# Patient Record
Sex: FEMALE | Race: WHITE
[De-identification: names, ages, dates, MRNs, and addresses within clinical notes are randomized per-mention and may not be internally consistent; named-entity substitution may affect disease eponyms.]

---

## 2017-08-02 ENCOUNTER — RECORDS - HEALTHEAST (OUTPATIENT)
Dept: ADMINISTRATIVE | Facility: OTHER | Age: 32
End: 2017-08-02

## 2018-09-05 ENCOUNTER — RECORDS - HEALTHEAST (OUTPATIENT)
Dept: ADMINISTRATIVE | Facility: OTHER | Age: 33
End: 2018-09-05

## 2022-07-15 ENCOUNTER — APPOINTMENT (OUTPATIENT)
Dept: URBAN - METROPOLITAN AREA CLINIC 259 | Age: 37
Setting detail: DERMATOLOGY
End: 2022-07-19

## 2022-07-15 DIAGNOSIS — D22 MELANOCYTIC NEVI: ICD-10-CM

## 2022-07-15 DIAGNOSIS — L57.8 OTHER SKIN CHANGES DUE TO CHRONIC EXPOSURE TO NONIONIZING RADIATION: ICD-10-CM

## 2022-07-15 PROBLEM — D48.5 NEOPLASM OF UNCERTAIN BEHAVIOR OF SKIN: Status: ACTIVE | Noted: 2022-07-15

## 2022-07-15 PROBLEM — D22.71 MELANOCYTIC NEVI OF RIGHT LOWER LIMB, INCLUDING HIP: Status: ACTIVE | Noted: 2022-07-15

## 2022-07-15 PROBLEM — D22.5 MELANOCYTIC NEVI OF TRUNK: Status: ACTIVE | Noted: 2022-07-15

## 2022-07-15 PROCEDURE — OTHER MIPS QUALITY: OTHER

## 2022-07-15 PROCEDURE — 11301 SHAVE SKIN LESION 0.6-1.0 CM: CPT | Mod: 76

## 2022-07-15 PROCEDURE — OTHER SHAVE REMOVAL: OTHER

## 2022-07-15 PROCEDURE — OTHER COUNSELING: OTHER

## 2022-07-15 PROCEDURE — 11301 SHAVE SKIN LESION 0.6-1.0 CM: CPT

## 2022-07-15 PROCEDURE — 99203 OFFICE O/P NEW LOW 30 MIN: CPT | Mod: 25

## 2022-07-15 ASSESSMENT — LOCATION ZONE DERM
LOCATION ZONE: LEG
LOCATION ZONE: TRUNK

## 2022-07-15 ASSESSMENT — LOCATION DETAILED DESCRIPTION DERM
LOCATION DETAILED: RIGHT INGUINAL CREASE
LOCATION DETAILED: RIGHT SUPERIOR LATERAL UPPER BACK
LOCATION DETAILED: RIGHT ANTERIOR PROXIMAL THIGH
LOCATION DETAILED: RIGHT MID-UPPER BACK
LOCATION DETAILED: LEFT INGUINAL CREASE
LOCATION DETAILED: RIGHT SUPERIOR MEDIAL UPPER BACK

## 2022-07-15 ASSESSMENT — LOCATION SIMPLE DESCRIPTION DERM
LOCATION SIMPLE: GROIN
LOCATION SIMPLE: RIGHT BACK
LOCATION SIMPLE: RIGHT UPPER BACK
LOCATION SIMPLE: RIGHT THIGH

## 2022-07-15 NOTE — HPI: SKIN LESIONS
How Severe Is Your Skin Lesion?: mild
Have Your Skin Lesions Been Treated?: not been treated
Is This A New Presentation, Or A Follow-Up?: Skin Lesions
Additional History: Patient is here seeking treatment on moles she has on her right posterior shoulder, and in her groin. They are not bothersome, but she wants reassurance. Lastly, she also has a spot on her ankle she wants looked at too.

## 2022-07-15 NOTE — PROCEDURE: SHAVE REMOVAL
Medical Necessity Clause: This procedure was medically necessary because the lesion that was treated was:
Anesthesia Type: 1% lidocaine with epinephrine
Size Of Lesion In Cm (Required): 0.6
Billing Type: Third-Party Bill
Detail Level: Detailed
Hemostasis: Drysol
Render Path Notes In Note?: No
Notification Instructions: Patient will be notified of pathology results. However, patient instructed to call the office if not contacted within 2 weeks.
X Size Of Lesion In Cm (Optional): 0
Medical Necessity Information: It is in your best interest to select a reason for this procedure from the list below. All of these items fulfill various CMS LCD requirements except the new and changing color options.
Wound Care: Petrolatum
Biopsy Method: Dermablade
Was A Bandage Applied: Yes
Post-Care Instructions: I reviewed with the patient in detail post-care instructions. Patient is to keep the biopsy site dry overnight, and then apply bacitracin twice daily until healed. Patient may apply hydrogen peroxide soaks to remove any crusting.
Consent was obtained from the patient. The risks and benefits to therapy were discussed in detail. Specifically, the risks of infection, scarring, bleeding, prolonged wound healing, incomplete removal, allergy to anesthesia, nerve injury and recurrence were addressed. Prior to the procedure, the treatment site was clearly identified and confirmed by the patient. All components of Universal Protocol/PAUSE Rule completed.

## 2022-09-16 ENCOUNTER — APPOINTMENT (OUTPATIENT)
Dept: URBAN - METROPOLITAN AREA CLINIC 259 | Age: 37
Setting detail: DERMATOLOGY
End: 2022-09-16

## 2022-09-16 DIAGNOSIS — D22 MELANOCYTIC NEVI: ICD-10-CM

## 2022-09-16 DIAGNOSIS — Z71.89 OTHER SPECIFIED COUNSELING: ICD-10-CM

## 2022-09-16 DIAGNOSIS — L57.8 OTHER SKIN CHANGES DUE TO CHRONIC EXPOSURE TO NONIONIZING RADIATION: ICD-10-CM

## 2022-09-16 DIAGNOSIS — L81.4 OTHER MELANIN HYPERPIGMENTATION: ICD-10-CM

## 2022-09-16 DIAGNOSIS — D18.0 HEMANGIOMA: ICD-10-CM

## 2022-09-16 DIAGNOSIS — L21.8 OTHER SEBORRHEIC DERMATITIS: ICD-10-CM

## 2022-09-16 PROBLEM — D22.5 MELANOCYTIC NEVI OF TRUNK: Status: ACTIVE | Noted: 2022-09-16

## 2022-09-16 PROBLEM — D18.01 HEMANGIOMA OF SKIN AND SUBCUTANEOUS TISSUE: Status: ACTIVE | Noted: 2022-09-16

## 2022-09-16 PROCEDURE — 99213 OFFICE O/P EST LOW 20 MIN: CPT

## 2022-09-16 PROCEDURE — OTHER MIPS QUALITY: OTHER

## 2022-09-16 PROCEDURE — OTHER PRESCRIPTION: OTHER

## 2022-09-16 PROCEDURE — OTHER COUNSELING: OTHER

## 2022-09-16 RX ORDER — KETOCONAZOLE 20 MG/ML
SHAMPOO, SUSPENSION TOPICAL
Qty: 120 | Refills: 6 | Status: ERX | COMMUNITY
Start: 2022-09-16

## 2022-09-16 ASSESSMENT — LOCATION ZONE DERM
LOCATION ZONE: TRUNK
LOCATION ZONE: SCALP

## 2022-09-16 ASSESSMENT — LOCATION SIMPLE DESCRIPTION DERM
LOCATION SIMPLE: RIGHT SCALP
LOCATION SIMPLE: UPPER BACK
LOCATION SIMPLE: LEFT UPPER BACK

## 2022-09-16 ASSESSMENT — LOCATION DETAILED DESCRIPTION DERM
LOCATION DETAILED: INFERIOR THORACIC SPINE
LOCATION DETAILED: LEFT MEDIAL UPPER BACK
LOCATION DETAILED: RIGHT MEDIAL FRONTAL SCALP
LOCATION DETAILED: LEFT INFERIOR MEDIAL UPPER BACK

## 2022-09-16 NOTE — PROCEDURE: MIPS QUALITY
Detail Level: Generalized
Quality 431: Preventive Care And Screening: Unhealthy Alcohol Use - Screening: Patient not identified as an unhealthy alcohol user when screened for unhealthy alcohol use using a systematic screening method
Quality 130: Documentation Of Current Medications In The Medical Record: Current Medications Documented
Quality 226: Preventive Care And Screening: Tobacco Use: Screening And Cessation Intervention: Patient screened for tobacco use and is an ex/non-smoker
Quality 110: Preventive Care And Screening: Influenza Immunization: Influenza Immunization Ordered or Recommended, but not Administered due to system reason

## 2022-09-16 NOTE — HPI: FULL BODY SKIN EXAMINATION
How Severe Are Your Spot(S)?: mild
What Is The Reason For Today's Visit?: Full Body Skin Examination
What Is The Reason For Today's Visit? (Being Monitored For X): concerning skin lesions on an annual basis
Additional History: Pt states she had a couple moles removed last time she was here. She has lesions that look similar to the ones that were removed.

## 2023-03-23 ENCOUNTER — APPOINTMENT (OUTPATIENT)
Dept: URBAN - METROPOLITAN AREA CLINIC 257 | Age: 38
Setting detail: DERMATOLOGY
End: 2023-03-23

## 2023-03-23 DIAGNOSIS — D22 MELANOCYTIC NEVI: ICD-10-CM

## 2023-03-23 DIAGNOSIS — Z87.2 PERSONAL HISTORY OF DISEASES OF THE SKIN AND SUBCUTANEOUS TISSUE: ICD-10-CM

## 2023-03-23 PROBLEM — D22.61 MELANOCYTIC NEVI OF RIGHT UPPER LIMB, INCLUDING SHOULDER: Status: ACTIVE | Noted: 2023-03-23

## 2023-03-23 PROCEDURE — OTHER COUNSELING: OTHER

## 2023-03-23 PROCEDURE — OTHER MIPS QUALITY: OTHER

## 2023-03-23 PROCEDURE — 99212 OFFICE O/P EST SF 10 MIN: CPT

## 2023-03-23 ASSESSMENT — LOCATION SIMPLE DESCRIPTION DERM
LOCATION SIMPLE: RIGHT FOREARM
LOCATION SIMPLE: RIGHT BACK

## 2023-03-23 ASSESSMENT — LOCATION ZONE DERM
LOCATION ZONE: TRUNK
LOCATION ZONE: ARM

## 2023-03-23 ASSESSMENT — LOCATION DETAILED DESCRIPTION DERM
LOCATION DETAILED: RIGHT VENTRAL PROXIMAL FOREARM
LOCATION DETAILED: RIGHT SUPERIOR LATERAL UPPER BACK

## 2023-05-26 ENCOUNTER — RX ONLY (RX ONLY)
Age: 38
End: 2023-05-26

## 2023-05-26 ENCOUNTER — APPOINTMENT (OUTPATIENT)
Dept: URBAN - METROPOLITAN AREA CLINIC 257 | Age: 38
Setting detail: DERMATOLOGY
End: 2023-05-26

## 2023-05-26 DIAGNOSIS — L65.8 OTHER SPECIFIED NONSCARRING HAIR LOSS: ICD-10-CM

## 2023-05-26 PROCEDURE — OTHER MIPS QUALITY: OTHER

## 2023-05-26 PROCEDURE — OTHER COUNSELING: OTHER

## 2023-05-26 PROCEDURE — 99213 OFFICE O/P EST LOW 20 MIN: CPT

## 2023-05-26 PROCEDURE — OTHER PRESCRIPTION: OTHER

## 2023-05-26 RX ORDER — MINOXIDIL 2.5 MG/1
TABLET ORAL
Qty: 45 | Refills: 0 | Status: ERX

## 2023-05-26 RX ORDER — MINOXIDIL 2.5 MG/1
TABLET ORAL
Qty: 45 | Refills: 0 | Status: CANCELLED

## 2023-05-26 RX ORDER — MINOXIDIL 2.5 MG/1
TABLET ORAL
Qty: 45 | Refills: 0 | COMMUNITY
Start: 2023-05-26

## 2023-05-26 ASSESSMENT — LOCATION DETAILED DESCRIPTION DERM
LOCATION DETAILED: MEDIAL FRONTAL SCALP
LOCATION DETAILED: LEFT SUPERIOR PARIETAL SCALP
LOCATION DETAILED: RIGHT SUPERIOR PARIETAL SCALP
LOCATION DETAILED: RIGHT CENTRAL FRONTAL SCALP

## 2023-05-26 ASSESSMENT — LOCATION ZONE DERM
LOCATION ZONE: SCALP
LOCATION ZONE: SCALP

## 2023-05-26 ASSESSMENT — LOCATION SIMPLE DESCRIPTION DERM
LOCATION SIMPLE: FRONTAL SCALP
LOCATION SIMPLE: RIGHT SCALP
LOCATION SIMPLE: SCALP

## 2023-08-22 RX ORDER — MINOXIDIL 2.5 MG/1
TABLET ORAL
Qty: 45 | Refills: 2 | Status: ERX

## 2023-08-29 ENCOUNTER — APPOINTMENT (OUTPATIENT)
Dept: URBAN - METROPOLITAN AREA CLINIC 259 | Age: 38
Setting detail: DERMATOLOGY
End: 2023-08-29

## 2023-08-29 DIAGNOSIS — L65.8 OTHER SPECIFIED NONSCARRING HAIR LOSS: ICD-10-CM

## 2023-08-29 PROCEDURE — 99213 OFFICE O/P EST LOW 20 MIN: CPT

## 2023-08-29 PROCEDURE — OTHER PRESCRIPTION MEDICATION MANAGEMENT: OTHER

## 2023-08-29 PROCEDURE — OTHER MIPS QUALITY: OTHER

## 2023-08-29 PROCEDURE — OTHER COUNSELING: OTHER

## 2023-08-29 ASSESSMENT — LOCATION DETAILED DESCRIPTION DERM: LOCATION DETAILED: MID-FRONTAL SCALP

## 2023-08-29 ASSESSMENT — LOCATION SIMPLE DESCRIPTION DERM: LOCATION SIMPLE: ANTERIOR SCALP

## 2023-08-29 ASSESSMENT — LOCATION ZONE DERM: LOCATION ZONE: SCALP

## 2023-08-29 NOTE — PROCEDURE: PRESCRIPTION MEDICATION MANAGEMENT
Detail Level: Zone
Continue Regimen: Half a Minoxidil 2.5mg tablet daily for 3 months
Render In Strict Bullet Format?: No
Plan: Pt will RTC in 3 months and continue taking her 90 day supply of Minoxidil.

## 2023-11-20 ENCOUNTER — APPOINTMENT (OUTPATIENT)
Dept: URBAN - METROPOLITAN AREA CLINIC 259 | Age: 38
Setting detail: DERMATOLOGY
End: 2023-11-21

## 2023-11-20 DIAGNOSIS — D49.2 NEOPLASM OF UNSPECIFIED BEHAVIOR OF BONE, SOFT TISSUE, AND SKIN: ICD-10-CM

## 2023-11-20 DIAGNOSIS — L65.8 OTHER SPECIFIED NONSCARRING HAIR LOSS: ICD-10-CM

## 2023-11-20 PROCEDURE — OTHER COUNSELING: OTHER

## 2023-11-20 PROCEDURE — OTHER PRESCRIPTION MEDICATION MANAGEMENT: OTHER

## 2023-11-20 PROCEDURE — 99213 OFFICE O/P EST LOW 20 MIN: CPT | Mod: 25

## 2023-11-20 PROCEDURE — OTHER PRESCRIPTION: OTHER

## 2023-11-20 PROCEDURE — OTHER MIPS QUALITY: OTHER

## 2023-11-20 PROCEDURE — OTHER SHAVE REMOVAL: OTHER

## 2023-11-20 PROCEDURE — 11311 SHAVE SKIN LESION 0.6-1.0 CM: CPT

## 2023-11-20 RX ORDER — MINOXIDIL 2.5 MG/1
TABLET ORAL
Qty: 45 | Refills: 3 | Status: ERX

## 2023-11-20 ASSESSMENT — LOCATION ZONE DERM
LOCATION ZONE: FACE
LOCATION ZONE: SCALP

## 2023-11-20 ASSESSMENT — LOCATION SIMPLE DESCRIPTION DERM
LOCATION SIMPLE: ANTERIOR SCALP
LOCATION SIMPLE: RIGHT FOREHEAD

## 2023-11-20 ASSESSMENT — LOCATION DETAILED DESCRIPTION DERM
LOCATION DETAILED: MID-FRONTAL SCALP
LOCATION DETAILED: RIGHT SUPERIOR FOREHEAD

## 2023-11-20 NOTE — PROCEDURE: COUNSELING
Patient Specific Counseling (Will Not Stick From Patient To Patient): **Recommended to try Silgaen scar gel or vitamin e oil
Detail Level: Detailed
Detail Level: Zone

## 2023-11-20 NOTE — PROCEDURE: PRESCRIPTION MEDICATION MANAGEMENT
Plan: Consult about PRP with Vi or SLP.
Render In Strict Bullet Format?: No
Continue Regimen: Minoxidil 2.5 tablet
Detail Level: Zone

## 2023-11-20 NOTE — HPI: SKIN LESION
What Type Of Note Output Would You Prefer (Optional)?: Standard Output
How Severe Is Your Skin Lesion?: mild
Has Your Skin Lesion Been Treated?: not been treated
Is This A New Presentation, Or A Follow-Up?: Mole
Additional History: Pt states she has a mole on her forehead that she would like removed if possible today.

## 2023-12-19 ENCOUNTER — APPOINTMENT (OUTPATIENT)
Dept: URBAN - METROPOLITAN AREA CLINIC 259 | Age: 38
Setting detail: DERMATOLOGY
End: 2023-12-19

## 2023-12-19 DIAGNOSIS — L65.8 OTHER SPECIFIED NONSCARRING HAIR LOSS: ICD-10-CM

## 2023-12-19 PROCEDURE — OTHER MIPS QUALITY: OTHER

## 2023-12-19 PROCEDURE — OTHER COSMETIC CONSULTATION: PRP: OTHER

## 2023-12-19 PROCEDURE — OTHER PLATELET RICH PLASMA INJECTION: OTHER

## 2023-12-19 ASSESSMENT — LOCATION ZONE DERM: LOCATION ZONE: SCALP

## 2023-12-19 ASSESSMENT — LOCATION DETAILED DESCRIPTION DERM: LOCATION DETAILED: POSTERIOR MID-PARIETAL SCALP

## 2023-12-19 ASSESSMENT — LOCATION SIMPLE DESCRIPTION DERM: LOCATION SIMPLE: POSTERIOR SCALP

## 2023-12-19 NOTE — PROCEDURE: PLATELET RICH PLASMA INJECTION
Depth In Mm (Will Not Render If 0): 0
Amount Of Blood Processed (Optional - Include Units): 13 mL
Detail Level: Zone
Topical Anesthesia Type: ice and vibration
Site Of Venipuncture?: right antecubital
Consent: Written consent obtained, risks reviewed including but not limited to pain, scarring, infection and incomplete improvement.  Patient understands the procedure is cosmetic in nature and will require out of pocket payment.
Venipuncture Paragraph: An alcohol pad was applied to the venipuncture site. Venipuncture was performed using a butterfly needle. Pressure and a bandaid was applied to the site. No complications were noted.
Price (Use Numbers Only, No Special Characters Or $): 290
Which Technique?: Default
Length Of Topical Anesthesia Application (Optional): 20 minutes
Treatment Number (Optional): 1
Who Performed The Venipuncture?: Bernice Cochran MA
Post-Care Instructions: After the procedure your scalp will be tender, and you may develop a headache.  You can ice the treated area and take acetaminophen or ibuprofen for comfort.  You may have a small amount of bleeding that can be dabbed with a clean gauze pad. \\Nato may wash your hair 2-3 hours after treatment.
Number Of Tubes Drawn: 2
Show Additional Techniques: Yes
Amount Of Blood Collected (Optional - Include Units): 30 mL

## 2024-01-29 ENCOUNTER — APPOINTMENT (OUTPATIENT)
Dept: URBAN - METROPOLITAN AREA CLINIC 259 | Age: 39
Setting detail: DERMATOLOGY
End: 2024-01-29

## 2024-01-29 DIAGNOSIS — L65.8 OTHER SPECIFIED NONSCARRING HAIR LOSS: ICD-10-CM

## 2024-01-29 PROCEDURE — OTHER MIPS QUALITY: OTHER

## 2024-01-29 PROCEDURE — OTHER PLATELET RICH PLASMA INJECTION: OTHER

## 2024-01-29 PROCEDURE — OTHER COSMETIC CONSULTATION: PRP: OTHER

## 2024-01-29 ASSESSMENT — LOCATION ZONE DERM: LOCATION ZONE: SCALP

## 2024-01-29 ASSESSMENT — LOCATION DETAILED DESCRIPTION DERM: LOCATION DETAILED: POSTERIOR MID-PARIETAL SCALP

## 2024-01-29 ASSESSMENT — LOCATION SIMPLE DESCRIPTION DERM: LOCATION SIMPLE: POSTERIOR SCALP

## 2024-01-29 NOTE — PROCEDURE: PLATELET RICH PLASMA INJECTION
Depth In Mm (Will Not Render If 0): 0
Amount Of Blood Processed (Optional - Include Units): 13 mL
Detail Level: Zone
Topical Anesthesia Type: ice and vibration
Site Of Venipuncture?: right antecubital
Consent: Written consent obtained, risks reviewed including but not limited to pain, scarring, infection and incomplete improvement.  Patient understands the procedure is cosmetic in nature and will require out of pocket payment.
Venipuncture Paragraph: An alcohol pad was applied to the venipuncture site. Venipuncture was performed using a butterfly needle. Pressure and a bandaid was applied to the site. No complications were noted.
Which Technique?: Default
Length Of Topical Anesthesia Application (Optional): 20 minutes
Treatment Number (Optional): 2
Who Performed The Venipuncture?: Bernice Cochran MA
Post-Care Instructions: After the procedure your scalp will be tender, and you may develop a headache.  You can ice the treated area and take acetaminophen or ibuprofen for comfort.  You may have a small amount of bleeding that can be dabbed with a clean gauze pad. \\Nato may wash your hair 2-3 hours after treatment.
Show Additional Techniques: Yes
Amount Of Blood Collected (Optional - Include Units): 30 mL

## 2024-02-08 ENCOUNTER — APPOINTMENT (OUTPATIENT)
Dept: URBAN - METROPOLITAN AREA CLINIC 259 | Age: 39
Setting detail: DERMATOLOGY
End: 2024-02-08

## 2024-02-08 DIAGNOSIS — L57.8 OTHER SKIN CHANGES DUE TO CHRONIC EXPOSURE TO NONIONIZING RADIATION: ICD-10-CM

## 2024-02-08 DIAGNOSIS — Z87.2 PERSONAL HISTORY OF DISEASES OF THE SKIN AND SUBCUTANEOUS TISSUE: ICD-10-CM

## 2024-02-08 DIAGNOSIS — D18.0 HEMANGIOMA: ICD-10-CM

## 2024-02-08 DIAGNOSIS — Z71.89 OTHER SPECIFIED COUNSELING: ICD-10-CM

## 2024-02-08 DIAGNOSIS — L81.4 OTHER MELANIN HYPERPIGMENTATION: ICD-10-CM

## 2024-02-08 DIAGNOSIS — D22 MELANOCYTIC NEVI: ICD-10-CM

## 2024-02-08 PROBLEM — D22.5 MELANOCYTIC NEVI OF TRUNK: Status: ACTIVE | Noted: 2024-02-08

## 2024-02-08 PROBLEM — D18.01 HEMANGIOMA OF SKIN AND SUBCUTANEOUS TISSUE: Status: ACTIVE | Noted: 2024-02-08

## 2024-02-08 PROCEDURE — OTHER MIPS QUALITY: OTHER

## 2024-02-08 PROCEDURE — 99213 OFFICE O/P EST LOW 20 MIN: CPT

## 2024-02-08 PROCEDURE — OTHER COUNSELING: OTHER

## 2024-02-08 ASSESSMENT — LOCATION SIMPLE DESCRIPTION DERM
LOCATION SIMPLE: UPPER BACK
LOCATION SIMPLE: RIGHT UPPER BACK
LOCATION SIMPLE: LEFT UPPER BACK

## 2024-02-08 ASSESSMENT — LOCATION DETAILED DESCRIPTION DERM
LOCATION DETAILED: LEFT SUPERIOR UPPER BACK
LOCATION DETAILED: RIGHT SUPERIOR MEDIAL UPPER BACK
LOCATION DETAILED: LEFT LATERAL UPPER BACK
LOCATION DETAILED: INFERIOR THORACIC SPINE
LOCATION DETAILED: RIGHT SUPERIOR UPPER BACK

## 2024-02-08 ASSESSMENT — LOCATION ZONE DERM: LOCATION ZONE: TRUNK

## 2024-02-08 NOTE — HPI: FULL BODY SKIN EXAMINATION
What Type Of Note Output Would You Prefer (Optional)?: Standard Output
What Is The Reason For Today's Visit?: Full Body Skin Examination
What Is The Reason For Today's Visit? (Being Monitored For X): concerning skin lesions on an annual basis
Additional History: Pt has a mole on her left eyebrow she would like checked and she states she has a lot of moles on her upper back she would like checked

## 2024-02-08 NOTE — PROCEDURE: COUNSELING
Detail Level: Generalized
Detail Level: Detailed
Patient Specific Counseling (Will Not Stick From Patient To Patient): No reoccurrence

## 2024-02-26 ENCOUNTER — LAB REQUISITION (OUTPATIENT)
Dept: LAB | Facility: CLINIC | Age: 39
End: 2024-02-26
Payer: COMMERCIAL

## 2024-02-26 DIAGNOSIS — Z01.419 ENCOUNTER FOR GYNECOLOGICAL EXAMINATION (GENERAL) (ROUTINE) WITHOUT ABNORMAL FINDINGS: ICD-10-CM

## 2024-02-26 PROCEDURE — 87624 HPV HI-RISK TYP POOLED RSLT: CPT | Mod: ORL | Performed by: OBSTETRICS & GYNECOLOGY

## 2024-02-26 PROCEDURE — 84443 ASSAY THYROID STIM HORMONE: CPT | Mod: ORL | Performed by: OBSTETRICS & GYNECOLOGY

## 2024-02-26 PROCEDURE — 84439 ASSAY OF FREE THYROXINE: CPT | Mod: ORL | Performed by: OBSTETRICS & GYNECOLOGY

## 2024-02-26 PROCEDURE — G0145 SCR C/V CYTO,THINLAYER,RESCR: HCPCS | Mod: ORL | Performed by: OBSTETRICS & GYNECOLOGY

## 2024-02-27 LAB
T4 FREE SERPL-MCNC: 1.41 NG/DL (ref 0.9–1.7)
TSH SERPL DL<=0.005 MIU/L-ACNC: 4.72 UIU/ML (ref 0.3–4.2)

## 2024-03-01 LAB
BKR LAB AP GYN ADEQUACY: NORMAL
BKR LAB AP GYN INTERPRETATION: NORMAL
BKR LAB AP HPV REFLEX: NORMAL
BKR LAB AP LMP: NORMAL
BKR LAB AP PREVIOUS ABNL DX: NORMAL
BKR LAB AP PREVIOUS ABNORMAL: NORMAL
PATH REPORT.COMMENTS IMP SPEC: NORMAL
PATH REPORT.COMMENTS IMP SPEC: NORMAL
PATH REPORT.RELEVANT HX SPEC: NORMAL

## 2024-03-04 ENCOUNTER — APPOINTMENT (OUTPATIENT)
Dept: URBAN - METROPOLITAN AREA CLINIC 259 | Age: 39
Setting detail: DERMATOLOGY
End: 2024-03-04

## 2024-03-04 DIAGNOSIS — L65.8 OTHER SPECIFIED NONSCARRING HAIR LOSS: ICD-10-CM

## 2024-03-04 LAB
HUMAN PAPILLOMA VIRUS 16 DNA: NEGATIVE
HUMAN PAPILLOMA VIRUS 18 DNA: NEGATIVE
HUMAN PAPILLOMA VIRUS FINAL DIAGNOSIS: NORMAL
HUMAN PAPILLOMA VIRUS OTHER HR: NEGATIVE

## 2024-03-04 PROCEDURE — OTHER MIPS QUALITY: OTHER

## 2024-03-04 PROCEDURE — OTHER PLATELET RICH PLASMA INJECTION: OTHER

## 2024-03-04 PROCEDURE — OTHER COSMETIC CONSULTATION: PRP: OTHER

## 2024-03-04 ASSESSMENT — LOCATION ZONE DERM: LOCATION ZONE: SCALP

## 2024-03-04 ASSESSMENT — LOCATION DETAILED DESCRIPTION DERM: LOCATION DETAILED: POSTERIOR MID-PARIETAL SCALP

## 2024-03-04 ASSESSMENT — LOCATION SIMPLE DESCRIPTION DERM: LOCATION SIMPLE: POSTERIOR SCALP

## 2024-03-04 NOTE — PROCEDURE: PLATELET RICH PLASMA INJECTION
Depth In Mm (Will Not Render If 0): 0
Amount Of Blood Processed (Optional - Include Units): 12.5 mL
Detail Level: Zone
Topical Anesthesia Type: ice and vibration
Site Of Venipuncture?: right antecubital
Consent: Written consent obtained, risks reviewed including but not limited to pain, scarring, infection and incomplete improvement.  Patient understands the procedure is cosmetic in nature and will require out of pocket payment.
Venipuncture Paragraph: An alcohol pad was applied to the venipuncture site. Venipuncture was performed using a butterfly needle. Pressure and a bandaid was applied to the site. No complications were noted.
Price (Use Numbers Only, No Special Characters Or $): 647
Which Technique?: Default
Length Of Topical Anesthesia Application (Optional): 20 minutes
Treatment Number (Optional): 3
Who Performed The Venipuncture?: Bernice Cochran MA
Post-Care Instructions: After the procedure your scalp will be tender, and you may develop a headache.  You can ice the treated area and take acetaminophen or ibuprofen for comfort.  You may have a small amount of bleeding that can be dabbed with a clean gauze pad. \\Nato may wash your hair 2-3 hours after treatment.
Number Of Tubes Drawn: 2
Show Additional Techniques: Yes
Amount Of Blood Collected (Optional - Include Units): 30 mL
Yes

## 2024-03-07 RX ORDER — MINOXIDIL 2.5 MG/1
TABLET ORAL
Qty: 45 | Refills: 3 | Status: ERX

## 2024-03-20 ENCOUNTER — APPOINTMENT (OUTPATIENT)
Dept: URBAN - METROPOLITAN AREA CLINIC 259 | Age: 39
Setting detail: DERMATOLOGY
End: 2024-03-21

## 2024-03-20 DIAGNOSIS — D49.2 NEOPLASM OF UNSPECIFIED BEHAVIOR OF BONE, SOFT TISSUE, AND SKIN: ICD-10-CM

## 2024-03-20 PROCEDURE — OTHER MIPS QUALITY: OTHER

## 2024-03-20 PROCEDURE — OTHER COUNSELING: OTHER

## 2024-03-20 PROCEDURE — 11102 TANGNTL BX SKIN SINGLE LES: CPT

## 2024-03-20 PROCEDURE — OTHER BIOPSY BY SHAVE METHOD: OTHER

## 2024-03-20 ASSESSMENT — LOCATION ZONE DERM: LOCATION ZONE: FACE

## 2024-03-20 ASSESSMENT — LOCATION SIMPLE DESCRIPTION DERM: LOCATION SIMPLE: LEFT EYEBROW

## 2024-03-20 ASSESSMENT — LOCATION DETAILED DESCRIPTION DERM: LOCATION DETAILED: LEFT LATERAL EYEBROW

## 2024-03-20 NOTE — HPI: SKIN LESION
What Type Of Note Output Would You Prefer (Optional)?: Standard Output
How Severe Is Your Skin Lesion?: mild
Has Your Skin Lesion Been Treated?: not been treated
Is This A New Presentation, Or A Follow-Up?: Skin Lesion
Additional History: Patient would like to discuss treatment options. Patient presents for evaluation and management.
Which Family Member (Optional)?: Father

## 2024-03-20 NOTE — PROCEDURE: BIOPSY BY SHAVE METHOD
Detail Level: Detailed
Depth Of Biopsy: dermis
Was A Bandage Applied: Yes
Size Of Lesion In Cm: 0
Biopsy Type: H and E
Biopsy Method: double edge Personna blade
Anesthesia Type: 1% lidocaine with epinephrine and a 1:10 solution of 8.4% sodium bicarbonate
Anesthesia Volume In Cc: 0.5
Hemostasis: Drysol
Wound Care: Petrolatum
Dressing: bandage
Destruction After The Procedure: No
Type Of Destruction Used: Curettage
Curettage Text: The wound bed was treated with curettage after the biopsy was performed.
Cryotherapy Text: The wound bed was treated with cryotherapy after the biopsy was performed.
Electrodesiccation Text: The wound bed was treated with electrodesiccation after the biopsy was performed.
Electrodesiccation And Curettage Text: The wound bed was treated with electrodesiccation and curettage after the biopsy was performed.
Silver Nitrate Text: The wound bed was treated with silver nitrate after the biopsy was performed.
Lab: -[id number]
Consent: Written consent was obtained and risks were reviewed including but not limited to scarring, infection, bleeding, scabbing, incomplete removal, nerve damage and allergy to anesthesia.
Post-Care Instructions: I reviewed with the patient in detail post-care instructions. Patient is to keep the biopsy site dry overnight, and then apply bacitracin twice daily until healed. Patient may apply hydrogen peroxide soaks to remove any crusting.
Notification Instructions: Patient will be notified of biopsy results. However, patient instructed to call the office if not contacted within 2 weeks.
Billing Type: Third-Party Bill
Information: Selecting Yes will display possible errors in your note based on the variables you have selected. This validation is only offered as a suggestion for you. PLEASE NOTE THAT THE VALIDATION TEXT WILL BE REMOVED WHEN YOU FINALIZE YOUR NOTE. IF YOU WANT TO FAX A PRELIMINARY NOTE YOU WILL NEED TO TOGGLE THIS TO 'NO' IF YOU DO NOT WANT IT IN YOUR FAXED NOTE.

## 2024-09-04 ENCOUNTER — RX ONLY (RX ONLY)
Age: 39
End: 2024-09-04

## 2024-09-04 ENCOUNTER — APPOINTMENT (OUTPATIENT)
Dept: URBAN - METROPOLITAN AREA CLINIC 259 | Age: 39
Setting detail: DERMATOLOGY
End: 2024-09-04

## 2024-09-04 VITALS — HEIGHT: 62 IN | WEIGHT: 110 LBS

## 2024-09-04 DIAGNOSIS — L65.8 OTHER SPECIFIED NONSCARRING HAIR LOSS: ICD-10-CM

## 2024-09-04 PROCEDURE — OTHER COUNSELING: OTHER

## 2024-09-04 PROCEDURE — OTHER MIPS QUALITY: OTHER

## 2024-09-04 PROCEDURE — OTHER PRESCRIPTION MEDICATION MANAGEMENT: OTHER

## 2024-09-04 PROCEDURE — 99213 OFFICE O/P EST LOW 20 MIN: CPT

## 2024-09-04 RX ORDER — MINOXIDIL 2.5 MG/1
TABLET ORAL
Qty: 90 | Refills: 3 | Status: ERX

## 2024-09-04 ASSESSMENT — LOCATION ZONE DERM: LOCATION ZONE: SCALP

## 2024-09-04 ASSESSMENT — LOCATION SIMPLE DESCRIPTION DERM: LOCATION SIMPLE: POSTERIOR SCALP

## 2024-09-04 ASSESSMENT — LOCATION DETAILED DESCRIPTION DERM: LOCATION DETAILED: POSTERIOR MID-PARIETAL SCALP

## 2024-09-04 NOTE — PROCEDURE: PRESCRIPTION MEDICATION MANAGEMENT
Plan: Continue PRP treatments every 6-12 months or as needed for maintenance.
Modify Regimen: Increase minoxidil to 2.5mg/day
Render In Strict Bullet Format?: No
Detail Level: Zone

## 2024-10-31 ENCOUNTER — APPOINTMENT (OUTPATIENT)
Dept: URBAN - METROPOLITAN AREA CLINIC 259 | Age: 39
Setting detail: DERMATOLOGY
End: 2024-10-31

## 2024-10-31 DIAGNOSIS — L65.8 OTHER SPECIFIED NONSCARRING HAIR LOSS: ICD-10-CM

## 2024-10-31 PROCEDURE — OTHER MIPS QUALITY: OTHER

## 2024-10-31 PROCEDURE — OTHER PLATELET RICH PLASMA INJECTION: OTHER

## 2024-10-31 PROCEDURE — OTHER ADDITIONAL NOTES: OTHER

## 2024-10-31 ASSESSMENT — LOCATION DETAILED DESCRIPTION DERM: LOCATION DETAILED: POSTERIOR MID-PARIETAL SCALP

## 2024-10-31 ASSESSMENT — LOCATION SIMPLE DESCRIPTION DERM: LOCATION SIMPLE: POSTERIOR SCALP

## 2024-10-31 ASSESSMENT — LOCATION ZONE DERM: LOCATION ZONE: SCALP

## 2024-10-31 NOTE — PROCEDURE: PLATELET RICH PLASMA INJECTION
Depth In Mm (Will Not Render If 0): 0
Amount Of Blood Processed (Optional - Include Units): 12 mL
Detail Level: Zone
Topical Anesthesia Type: ice and vibration
Site Of Venipuncture?: right antecubital
Consent: Written consent obtained, risks reviewed including but not limited to pain, scarring, infection and incomplete improvement.  Patient understands the procedure is cosmetic in nature and will require out of pocket payment.
Venipuncture Paragraph: An alcohol pad was applied to the venipuncture site. Venipuncture was performed using a butterfly needle. Pressure and a bandaid was applied to the site. No complications were noted.
Price (Use Numbers Only, No Special Characters Or $): 721
Which Technique?: Default
Length Of Topical Anesthesia Application (Optional): 20 minutes
Treatment Number (Optional): 4
Who Performed The Venipuncture?: Gloria Cassidy MA
Post-Care Instructions: After the procedure your scalp will be tender, and you may develop a headache.  You can ice the treated area and take acetaminophen or ibuprofen for comfort.  You may have a small amount of bleeding that can be dabbed with a clean gauze pad. \\Nato may wash your hair 2-3 hours after treatment.
Number Of Tubes Drawn: 2
Show Additional Techniques: Yes
Amount Of Blood Collected (Optional - Include Units): 30 mL

## 2024-10-31 NOTE — PROCEDURE: ADDITIONAL NOTES
Additional Notes: Patient is currently continuing on minoxidil 2.5 mg/QD. She may follow up in 3-12 months for PRP injections PRN.
Render Risk Assessment In Note?: no
Detail Level: Simple

## 2025-01-20 ENCOUNTER — APPOINTMENT (OUTPATIENT)
Dept: URBAN - METROPOLITAN AREA CLINIC 259 | Age: 40
Setting detail: DERMATOLOGY
End: 2025-01-20

## 2025-01-20 DIAGNOSIS — Z41.9 ENCOUNTER FOR PROCEDURE FOR PURPOSES OTHER THAN REMEDYING HEALTH STATE, UNSPECIFIED: ICD-10-CM

## 2025-01-20 PROCEDURE — OTHER SIGNATURE HYDRAFACIAL: OTHER

## 2025-01-20 NOTE — PROCEDURE: SIGNATURE HYDRAFACIAL
Vacuum Pressure Low Setting (Will Not Render If Set To 0): 0
Solution: Activ-4
Treatment Number: 1
Procedure: Fusion
Procedure: Peel
Tip: Hydropeel Tip, Clear
Procedure: Boost
Tip: Hydropeel Tip, Teal
Solution Override
Indication: dehydrated skin
Procedure: Exfoliation
Price (Use Numbers Only, No Special Characters Or $): 175
Procedure: Extraction
Location: face
Procedure: Extend and Protect
Tip: Hydropeel Tip, Blue
Solution: GlySal 7.5%
Tip Override
Consent: Written consent obtained, risks reviewed including but not limited to crusting, scabbing, blistering, scarring, darker or lighter pigmentary change, bruising, and/or incomplete response.
Post-Care Instructions: I reviewed with the patient in detail post-care instructions. Patient should stay away from the sun and wear sun protection until treated areas are fully healed.
Solution: Beta-HD

## 2025-02-18 ENCOUNTER — APPOINTMENT (OUTPATIENT)
Dept: URBAN - METROPOLITAN AREA CLINIC 259 | Age: 40
Setting detail: DERMATOLOGY
End: 2025-02-18

## 2025-02-18 VITALS — HEIGHT: 62 IN | WEIGHT: 110 LBS

## 2025-02-18 DIAGNOSIS — D22 MELANOCYTIC NEVI: ICD-10-CM

## 2025-02-18 DIAGNOSIS — L82.1 OTHER SEBORRHEIC KERATOSIS: ICD-10-CM

## 2025-02-18 DIAGNOSIS — Z87.2 PERSONAL HISTORY OF DISEASES OF THE SKIN AND SUBCUTANEOUS TISSUE: ICD-10-CM

## 2025-02-18 DIAGNOSIS — L57.8 OTHER SKIN CHANGES DUE TO CHRONIC EXPOSURE TO NONIONIZING RADIATION: ICD-10-CM

## 2025-02-18 DIAGNOSIS — Z71.89 OTHER SPECIFIED COUNSELING: ICD-10-CM

## 2025-02-18 DIAGNOSIS — L21.8 OTHER SEBORRHEIC DERMATITIS: ICD-10-CM

## 2025-02-18 DIAGNOSIS — L81.4 OTHER MELANIN HYPERPIGMENTATION: ICD-10-CM

## 2025-02-18 DIAGNOSIS — D18.0 HEMANGIOMA: ICD-10-CM

## 2025-02-18 DIAGNOSIS — L72.0 EPIDERMAL CYST: ICD-10-CM

## 2025-02-18 PROBLEM — D18.01 HEMANGIOMA OF SKIN AND SUBCUTANEOUS TISSUE: Status: ACTIVE | Noted: 2025-02-18

## 2025-02-18 PROBLEM — D22.5 MELANOCYTIC NEVI OF TRUNK: Status: ACTIVE | Noted: 2025-02-18

## 2025-02-18 PROCEDURE — OTHER MIPS QUALITY: OTHER

## 2025-02-18 PROCEDURE — OTHER PRESCRIPTION: OTHER

## 2025-02-18 PROCEDURE — 99213 OFFICE O/P EST LOW 20 MIN: CPT

## 2025-02-18 PROCEDURE — OTHER PRESCRIPTION MEDICATION MANAGEMENT: OTHER

## 2025-02-18 PROCEDURE — OTHER COUNSELING: OTHER

## 2025-02-18 RX ORDER — KETOCONAZOLE 20 MG/ML
SHAMPOO, SUSPENSION TOPICAL
Qty: 120 | Refills: 6 | Status: ERX | COMMUNITY
Start: 2025-02-18

## 2025-02-18 ASSESSMENT — LOCATION SIMPLE DESCRIPTION DERM
LOCATION SIMPLE: LEFT EYEBROW
LOCATION SIMPLE: SCALP
LOCATION SIMPLE: LEFT UPPER BACK
LOCATION SIMPLE: RIGHT FOREARM

## 2025-02-18 ASSESSMENT — LOCATION DETAILED DESCRIPTION DERM
LOCATION DETAILED: LEFT SUPERIOR MEDIAL UPPER BACK
LOCATION DETAILED: LEFT SUPERIOR PARIETAL SCALP
LOCATION DETAILED: LEFT MEDIAL EYEBROW
LOCATION DETAILED: LEFT MEDIAL UPPER BACK
LOCATION DETAILED: RIGHT PROXIMAL DORSAL FOREARM

## 2025-02-18 ASSESSMENT — LOCATION ZONE DERM
LOCATION ZONE: FACE
LOCATION ZONE: ARM
LOCATION ZONE: TRUNK
LOCATION ZONE: SCALP

## 2025-02-18 NOTE — PROCEDURE: PRESCRIPTION MEDICATION MANAGEMENT
Render In Strict Bullet Format?: No
Initiate Treatment: Restart Ketoconazole 2% shampoo 2-3x/week
Detail Level: Zone

## 2025-02-18 NOTE — HPI: FULL BODY SKIN EXAMINATION
What Type Of Note Output Would You Prefer (Optional)?: Standard Output
What Is The Reason For Today's Visit?: Full Body Skin Examination
What Is The Reason For Today's Visit? (Being Monitored For X): concerning skin lesions on an annual basis
Additional History: The patient reports she has a white bump above her left eye which she states may be a stye. It has been present for months and is asymptomatic. She reports she has 2 moles on her right neck which have 2 colors. The patient reports she just got back from [place] and has lots of bug bites.

## 2025-03-13 PROCEDURE — 88305 TISSUE EXAM BY PATHOLOGIST: CPT | Mod: 26 | Performed by: STUDENT IN AN ORGANIZED HEALTH CARE EDUCATION/TRAINING PROGRAM

## 2025-03-13 PROCEDURE — 88305 TISSUE EXAM BY PATHOLOGIST: CPT | Mod: TC,ORL | Performed by: OBSTETRICS & GYNECOLOGY

## 2025-03-14 ENCOUNTER — LAB REQUISITION (OUTPATIENT)
Dept: LAB | Facility: CLINIC | Age: 40
End: 2025-03-14
Payer: COMMERCIAL

## 2025-03-19 LAB
PATH REPORT.COMMENTS IMP SPEC: NORMAL
PATH REPORT.COMMENTS IMP SPEC: NORMAL
PATH REPORT.FINAL DX SPEC: NORMAL
PATH REPORT.GROSS SPEC: NORMAL
PATH REPORT.MICROSCOPIC SPEC OTHER STN: NORMAL
PATH REPORT.RELEVANT HX SPEC: NORMAL
PHOTO IMAGE: NORMAL

## 2025-08-05 ENCOUNTER — APPOINTMENT (OUTPATIENT)
Dept: URBAN - METROPOLITAN AREA CLINIC 259 | Age: 40
Setting detail: DERMATOLOGY
End: 2025-08-05

## 2025-08-05 DIAGNOSIS — L65.8 OTHER SPECIFIED NONSCARRING HAIR LOSS: ICD-10-CM

## 2025-08-05 PROCEDURE — OTHER PLATELET RICH PLASMA INJECTION: OTHER

## 2025-08-05 PROCEDURE — OTHER COUNSELING: OTHER

## 2025-08-05 PROCEDURE — OTHER PRESCRIPTION MEDICATION MANAGEMENT: OTHER

## 2025-08-05 ASSESSMENT — LOCATION DETAILED DESCRIPTION DERM: LOCATION DETAILED: POSTERIOR MID-PARIETAL SCALP

## 2025-08-05 ASSESSMENT — LOCATION SIMPLE DESCRIPTION DERM: LOCATION SIMPLE: POSTERIOR SCALP

## 2025-08-05 ASSESSMENT — LOCATION ZONE DERM: LOCATION ZONE: SCALP
